# Patient Record
Sex: MALE | Race: WHITE
[De-identification: names, ages, dates, MRNs, and addresses within clinical notes are randomized per-mention and may not be internally consistent; named-entity substitution may affect disease eponyms.]

---

## 2019-09-28 ENCOUNTER — HOSPITAL ENCOUNTER (EMERGENCY)
Dept: HOSPITAL 88 - ER | Age: 49
Discharge: HOME | End: 2019-09-28
Payer: SELF-PAY

## 2019-09-28 VITALS — WEIGHT: 180 LBS | HEIGHT: 68 IN | BODY MASS INDEX: 27.28 KG/M2

## 2019-09-28 VITALS — DIASTOLIC BLOOD PRESSURE: 71 MMHG | SYSTOLIC BLOOD PRESSURE: 136 MMHG

## 2019-09-28 DIAGNOSIS — R06.00: ICD-10-CM

## 2019-09-28 DIAGNOSIS — R07.89: Primary | ICD-10-CM

## 2019-09-28 DIAGNOSIS — S22.31XA: ICD-10-CM
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
## 2019-09-28 DIAGNOSIS — R11.0: ICD-10-CM

## 2019-09-28 LAB
ALBUMIN SERPL-MCNC: 3.6 G/DL (ref 3.5–5)
ALBUMIN/GLOB SERPL: 1.1 {RATIO} (ref 0.8–2)
ALP SERPL-CCNC: 109 IU/L (ref 40–150)
ALT SERPL-CCNC: 18 IU/L (ref 0–55)
ANION GAP SERPL CALC-SCNC: 11.6 MMOL/L (ref 8–16)
BASOPHILS # BLD AUTO: 0 10*3/UL (ref 0–0.1)
BASOPHILS NFR BLD AUTO: 0.5 % (ref 0–1)
BUN SERPL-MCNC: 19 MG/DL (ref 7–26)
BUN/CREAT SERPL: 15 (ref 6–25)
CALCIUM SERPL-MCNC: 9.4 MG/DL (ref 8.4–10.2)
CHLORIDE SERPL-SCNC: 105 MMOL/L (ref 98–107)
CK MB SERPL-MCNC: 1.5 NG/ML (ref 0–5)
CK SERPL-CCNC: 109 IU/L (ref 30–200)
CO2 SERPL-SCNC: 28 MMOL/L (ref 22–29)
DEPRECATED NEUTROPHILS # BLD AUTO: 5.1 10*3/UL (ref 2.1–6.9)
EGFRCR SERPLBLD CKD-EPI 2021: 59 ML/MIN (ref 60–?)
EOSINOPHIL # BLD AUTO: 0.2 10*3/UL (ref 0–0.4)
EOSINOPHIL NFR BLD AUTO: 2.3 % (ref 0–6)
ERYTHROCYTE [DISTWIDTH] IN CORD BLOOD: 14.4 % (ref 11.7–14.4)
GLOBULIN PLAS-MCNC: 3.3 G/DL (ref 2.3–3.5)
GLUCOSE SERPLBLD-MCNC: 118 MG/DL (ref 74–118)
HCT VFR BLD AUTO: 43.3 % (ref 38.2–49.6)
HGB BLD-MCNC: 14.9 G/DL (ref 14–18)
LYMPHOCYTES # BLD: 1.5 10*3/UL (ref 1–3.2)
LYMPHOCYTES NFR BLD AUTO: 19.2 % (ref 18–39.1)
MCH RBC QN AUTO: 31.5 PG (ref 28–32)
MCHC RBC AUTO-ENTMCNC: 34.4 G/DL (ref 31–35)
MCV RBC AUTO: 91.5 FL (ref 81–99)
MONOCYTES # BLD AUTO: 0.9 10*3/UL (ref 0.2–0.8)
MONOCYTES NFR BLD AUTO: 11.4 % (ref 4.4–11.3)
NEUTS SEG NFR BLD AUTO: 66.3 % (ref 38.7–80)
PLATELET # BLD AUTO: 216 X10E3/UL (ref 140–360)
POTASSIUM SERPL-SCNC: 3.6 MMOL/L (ref 3.5–5.1)
RBC # BLD AUTO: 4.73 X10E6/UL (ref 4.3–5.7)
SODIUM SERPL-SCNC: 141 MMOL/L (ref 136–145)

## 2019-09-28 PROCEDURE — 93005 ELECTROCARDIOGRAM TRACING: CPT

## 2019-09-28 PROCEDURE — 99284 EMERGENCY DEPT VISIT MOD MDM: CPT

## 2019-09-28 PROCEDURE — 84484 ASSAY OF TROPONIN QUANT: CPT

## 2019-09-28 PROCEDURE — 80053 COMPREHEN METABOLIC PANEL: CPT

## 2019-09-28 PROCEDURE — 36415 COLL VENOUS BLD VENIPUNCTURE: CPT

## 2019-09-28 PROCEDURE — 85025 COMPLETE CBC W/AUTO DIFF WBC: CPT

## 2019-09-28 PROCEDURE — 82553 CREATINE MB FRACTION: CPT

## 2019-09-28 PROCEDURE — 71250 CT THORAX DX C-: CPT

## 2019-09-28 PROCEDURE — 82550 ASSAY OF CK (CPK): CPT

## 2019-09-28 NOTE — DIAGNOSTIC IMAGING REPORT
EXAM: CT Chest WITHOUT contrast

INDICATION: R posterior chest wall pain

COMPARISON: None



TECHNIQUE:

Chest was scanned utilizing a multidetector helical scanner from the lung apex

through the level of the adrenal glands without administration of IV contrast.

Absence of intravenous contrast decreases sensitivity for detection of

lymphadenopathy and vascular pathology. Coronal and sagittal reformations were

obtained. Routine protocol was performed.

     IV CONTRAST: None



COMPLICATIONS: None   



RADIATION DOSE: 

     Total DLP: 540 mGy*cm

     Estimated effective dose: (DLP x 0.014 x size factor) mSv

     CTDIvol has been reviewed. It is below the limits set by the Radiation

Protocol Committee (RPC).

     Dose modulation, iterative reconstruction, and/or weight based adjustment

of the mA/kV was utilized to reduce the radiation dose to as low as reasonably

achievable. None

           

FINDINGS:



LINES/ TUBES: None.



LUNGS AND AIRWAYS:  There is bibasilar atelectasis.  Airways are normal.



PLEURA: The pleural spaces are clear.



HEART AND MEDIASTINUM: The thyroid gland is normal.  No mediastinal, hilar or

axillary lymphadenopathy.  The heart is normal in size. There is no pericardial

effusion.     



UPPER ABDOMEN: Punctate nonobstructive calculus in a left renal superior

pulmonary calyx. Distention of the stomach and lower esophagus with ingested

material.



BONES: Acute nondisplaced fracture of posterolateral aspect of right rib 7.

Healing nondisplaced fracture of right ribs 4. Not healed nondisplaced fracture

of left rib 11.



SOFT TISSUES: Unremarkable.



IMPRESSION:



Acute nondisplaced fracture of posterolateral aspect of right rib 7.

Bibasilar atelectasis.

Distention of the stomach and lower esophagus with ingested material.





Signed by: Rayshawn Patterson DO on 9/28/2019 6:30 PM